# Patient Record
Sex: FEMALE | URBAN - METROPOLITAN AREA
[De-identification: names, ages, dates, MRNs, and addresses within clinical notes are randomized per-mention and may not be internally consistent; named-entity substitution may affect disease eponyms.]

---

## 2018-03-26 ENCOUNTER — TRANSFERRED RECORDS (OUTPATIENT)
Dept: HEALTH INFORMATION MANAGEMENT | Facility: CLINIC | Age: 23
End: 2018-03-26

## 2018-10-26 ENCOUNTER — TRANSFERRED RECORDS (OUTPATIENT)
Dept: HEALTH INFORMATION MANAGEMENT | Facility: CLINIC | Age: 23
End: 2018-10-26

## 2018-11-16 ENCOUNTER — OFFICE VISIT (OUTPATIENT)
Dept: PEDIATRICS | Facility: CLINIC | Age: 23
End: 2018-11-16
Payer: COMMERCIAL

## 2018-11-16 VITALS
OXYGEN SATURATION: 100 % | DIASTOLIC BLOOD PRESSURE: 82 MMHG | WEIGHT: 104.6 LBS | BODY MASS INDEX: 18.54 KG/M2 | SYSTOLIC BLOOD PRESSURE: 129 MMHG | TEMPERATURE: 98 F | HEART RATE: 102 BPM | HEIGHT: 63 IN

## 2018-11-16 DIAGNOSIS — R00.0 TACHYCARDIA: ICD-10-CM

## 2018-11-16 DIAGNOSIS — F98.8 ATTENTION DEFICIT DISORDER, UNSPECIFIED HYPERACTIVITY PRESENCE: ICD-10-CM

## 2018-11-16 DIAGNOSIS — F41.9 ANXIETY DISORDER, UNSPECIFIED TYPE: ICD-10-CM

## 2018-11-16 DIAGNOSIS — F32.A DEPRESSION, UNSPECIFIED DEPRESSION TYPE: ICD-10-CM

## 2018-11-16 DIAGNOSIS — R00.2 PALPITATIONS: Primary | ICD-10-CM

## 2018-11-16 DIAGNOSIS — D50.9 IRON DEFICIENCY ANEMIA, UNSPECIFIED IRON DEFICIENCY ANEMIA TYPE: ICD-10-CM

## 2018-11-16 DIAGNOSIS — H53.8 BLURRED VISION: ICD-10-CM

## 2018-11-16 LAB
ERYTHROCYTE [DISTWIDTH] IN BLOOD BY AUTOMATED COUNT: 11.2 % (ref 10–15)
FERRITIN SERPL-MCNC: 76 NG/ML (ref 12–150)
HCT VFR BLD AUTO: 42.4 % (ref 35–47)
HETEROPH AB SER QL: NEGATIVE
HGB BLD-MCNC: 14.3 G/DL (ref 11.7–15.7)
MCH RBC QN AUTO: 30.6 PG (ref 26.5–33)
MCHC RBC AUTO-ENTMCNC: 33.7 G/DL (ref 31.5–36.5)
MCV RBC AUTO: 91 FL (ref 78–100)
PLATELET # BLD AUTO: 318 10E9/L (ref 150–450)
RBC # BLD AUTO: 4.68 10E12/L (ref 3.8–5.2)
TSH SERPL DL<=0.005 MIU/L-ACNC: 1.38 MU/L (ref 0.4–4)
WBC # BLD AUTO: 8.4 10E9/L (ref 4–11)

## 2018-11-16 PROCEDURE — 86308 HETEROPHILE ANTIBODY SCREEN: CPT | Performed by: NURSE PRACTITIONER

## 2018-11-16 PROCEDURE — 85027 COMPLETE CBC AUTOMATED: CPT | Performed by: NURSE PRACTITIONER

## 2018-11-16 PROCEDURE — 99214 OFFICE O/P EST MOD 30 MIN: CPT | Performed by: NURSE PRACTITIONER

## 2018-11-16 PROCEDURE — 36415 COLL VENOUS BLD VENIPUNCTURE: CPT | Performed by: NURSE PRACTITIONER

## 2018-11-16 PROCEDURE — 84443 ASSAY THYROID STIM HORMONE: CPT | Performed by: NURSE PRACTITIONER

## 2018-11-16 PROCEDURE — 82728 ASSAY OF FERRITIN: CPT | Performed by: NURSE PRACTITIONER

## 2018-11-16 RX ORDER — DEXTROAMPHETAMINE SULFATE, DEXTROAMPHETAMINE SACCHARATE, AMPHETAMINE SULFATE AND AMPHETAMINE ASPARTATE 6.25; 6.25; 6.25; 6.25 MG/1; MG/1; MG/1; MG/1
CAPSULE, EXTENDED RELEASE ORAL
Refills: 0 | COMMUNITY
Start: 2018-10-17

## 2018-11-16 RX ORDER — ESCITALOPRAM OXALATE 20 MG/1
TABLET ORAL
Refills: 2 | COMMUNITY
Start: 2018-10-17

## 2018-11-16 RX ORDER — METHYLPHENIDATE HYDROCHLORIDE 10 MG/1
TABLET ORAL
Refills: 0 | COMMUNITY
Start: 2018-10-17

## 2018-11-16 RX ORDER — NORETHINDRONE AND ETHINYL ESTRADIOL 1 MG-35MCG
KIT ORAL
Refills: 3 | COMMUNITY
Start: 2018-10-20

## 2018-11-16 NOTE — NURSING NOTE
"Chief Complaint   Patient presents with     Establish Care     Blood Draw     requesting iron levels checked, hemoglobin, mono test     Heart Problem     patient notes possible heart murmur       Initial /82 (BP Location: Left arm, Patient Position: Sitting, Cuff Size: Adult Small)  Pulse 102  Temp 98  F (36.7  C) (Temporal)  Ht 5' 3.25\" (1.607 m)  Wt 104 lb 9.6 oz (47.4 kg)  SpO2 100%  Breastfeeding? No  BMI 18.38 kg/m2 Estimated body mass index is 18.38 kg/(m^2) as calculated from the following:    Height as of this encounter: 5' 3.25\" (1.607 m).    Weight as of this encounter: 104 lb 9.6 oz (47.4 kg).  Medication Reconciliation: complete      LORA Manriquez      "

## 2018-11-16 NOTE — MR AVS SNAPSHOT
After Visit Summary   11/16/2018    Ana Luis    MRN: [id number]           Patient Information     Date Of Birth          1995        Visit Information        Provider Department      11/16/2018 2:50 PM Reema Cartagena APRN CNP [facility]        Today's Diagnoses     Palpitations    -  1    Attention deficit disorder, unspecified hyperactivity presence        Anxiety disorder, unspecified type        Depression, unspecified depression type        Iron deficiency anemia, unspecified iron deficiency anemia type        Tachycardia        Blurred vision          Care Instructions    PLAN:   1.   Symptomatic therapy suggested: Continue current medications.    2.  Orders Placed This Encounter   Medications     ADDERALL XR 25 MG 24 hr capsule     Sig: TAKE 1 CAPSULE BY MOUTH ONCE EVERY MORNING     Refill:  0     escitalopram (LEXAPRO) 20 MG tablet     Sig: TAKE 1/2 TABLET BY MOUTH FOR 2 WEEKS THEN 1 TABLET DAILY THERAFTER     Refill:  2     methylphenidate (RITALIN) 10 MG tablet     Sig: TAKE 1 TABLET BY MOUTH ONCE DAILY BETWEEN 2-5PM FOR HOME WORK     Refill:  0     ALAYCEN 1/35 1-35 MG-MCG per tablet     Sig: TAKE 1 ACTIVE TABLET BY ORAL ROUTE ONCE DAILY     Refill:  3     Ibuprofen (ADVIL PO)     Sig: prn     Orders Placed This Encounter   Procedures     CBC with platelets     Ferritin     TSH with free T4 reflex     Comprehensive metabolic panel     Mononucleosis screen     MENTAL HEALTH REFERRAL  - Adult; Outpatient Treatment; Individual/Couples/Family/Group Therapy/Health Psychology; [facility] [place]: [facility] [phone]; We will contact you to schedule the appointment or please call with any questions     Echocardiogram Complete       3. Patient needs to follow up in if no improvement,or sooner if worsening of symptoms or other symptoms develop.  FURTHER TESTING:       - echocardiogram   Referral to counseling and please make appointment with eye  doctor  Will follow up and/or notify patient on results via phone or mail to determine further need for followup    It was a pleasure seeing you today at the [facility]. Thank you for allowing us to care for you today. We truly hope we provided you with the excellent service you deserve. Please let us know if there is anything else we can do for you so we can be sure you are leaving completley satisfied with your care experience.       General information about your clinic   Clinic Hours Lab Hours (Appointments are required)   Mon-Thurs: 7:30 AM - 7 PM Mon-Thurs: 7:30 AM - 7 PM   Fri: 7:30 AM - 5 PM Fri: 7:30 AM - 5 PM        After Hours Nurse Advise & Appts:  [place] Nurse Advisors: [phone]  [facility] On Call: to make appointments anytime: [phone] On Call Physician: call [phone] and answering service will page the on call physician.        For urgent appointments, please call [phone] and ask for the triage nurse or your care team clinic nurse.  How to contact my care team:  MyChart: [email or web address]   Phone: [phone]   Fax: [phone]       [place] [facility] [facility]:   Phone: [phone]  Hours: 8:00 AM - 6:00 PM  Medication Refills:  Call your pharmacy and they will forward the refill to us. Please allow 3 business days for your refills to be completed.       Normal or non-critical lab and imaging results will be communicated to you by MyChart, letter or phone within 7 days.  If you do not hear from us within 10 days, please call the clinic. If you have a critical or abnormal lab result, we will notify you by phone as soon as possible.       We now have PWIC (Pediatric Walk in Care)  Monday-Friday from 7:30-4. Simply walk in and be seen for your urgent needs like cough, fever, rash, diarrhea or vomiting, pink eye, UTI. No appointments needed. Ask one of the team for more information      -Your Care Team:    Dr. Phillip Flores - Internal Medicine/Pediatrics     Charlotte Timmons - Family Medicine  Dr. Jessica Ha - Pediatrics  Dr. Telma Donato - Pediatrics  Reema Cartagena CNP - Family Practice Nurse Practitioner                         Follow-ups after your visit        Additional Services     MENTAL HEALTH REFERRAL  - Adult; Outpatient Treatment; Individual/Couples/Family/Group Therapy/Health Psychology; [facility]: [facility] [phone]; We will contact you to schedule the appointment or please call with any questions       All scheduling is subject to the client's specific insurance plan & benefits, provider/location availability, and provider clinical specialities.  Please arrive 15 minutes early for your first appointment and bring your completed paperwork.    Please be aware that coverage of these services is subject to the terms and limitations of your health insurance plan.  Call member services at your health plan with any benefit or coverage questions.                            Future tests that were ordered for you today     Open Future Orders        Priority Expected Expires Ordered    Echocardiogram Complete Routine  11/16/2019 11/16/2018    Comprehensive metabolic panel Routine  2/15/2019 11/16/2018            Who to contact     If you have questions or need follow up information about today's clinic visit or your schedule please contact [facility] directly at [phone].  Normal or non-critical lab and imaging results will be communicated to you by MyChart, letter or phone within 4 business days after the clinic has received the results. If you do not hear from us within 7 days, please contact the clinic through MyChart or phone. If you have a critical or abnormal lab result, we will notify you by phone as soon as possible.  Submit refill requests through [employer] or call your pharmacy and they will forward the refill request to us. Please allow 3 business days for your refill to be completed.          Additional  "Information About Your Visit        [employer] Information     [employer] is an electronic gateway that provides easy, online access to your medical records. With [employer], you can request a clinic appointment, read your test results, renew a prescription or communicate with your care team.     To sign up for [employer] visit the website at [email or web address]   You will be asked to enter the access code listed below, as well as some personal information. Please follow the directions to create your username and password.     Your access code is: [email or web address]  Expires: 2019  3:30 PM     Your access code will  in 90 days. If you need help or a new code, please contact your [facility] or call [phone] for assistance.        Care EveryWhere ID     This is your Care EveryWhere ID. This could be used by other organizations to access your [place] medical records  [id number]        Your Vitals Were     Pulse Temperature Height Pulse Oximetry Breastfeeding? BMI (Body Mass Index)    102 98  F (36.7  C) (Temporal) 5' 3.25\" (1.607 m) 100% No 18.38 kg/m2       Blood Pressure from Last 3 Encounters:   18 129/82   09 100/62    Weight from Last 3 Encounters:   18 104 lb 9.6 oz (47.4 kg)   09 63 lb (28.6 kg) (3 %)*     * Growth percentiles are based on [facility] 2-20 Years data.              We Performed the Following     CBC with platelets     Ferritin     MENTAL HEALTH REFERRAL  - Adult; Outpatient Treatment; Individual/Couples/Family/Group Therapy/Health Psychology; [facility]: [facility] [phone]; We will contact you to schedule the appointment or please call with any questions     Mononucleosis screen     TSH with free T4 reflex        Primary Care Provider Fax #    Physician No Ref-Primary [phone]       No address on file        Equal Access to Services     LEAH CHARLES : Andressa Dean, liang bang, alicia scanlon " shala billsharshad goldenadelfo choaakizzy ah. [facility] [phone].    ATENCIÓN: Si habla luis armando, tiene a harris disposición servicios gratuitos de asistencia lingüística. Kendall al [phone].    We comply with applicable federal civil rights laws and [place] laws. We do not discriminate on the basis of race, color, national origin, age, disability, sex, sexual orientation, or gender identity.            Thank you!     Thank you for choosing [facility]  for your care. Our goal is always to provide you with excellent care. Hearing back from our patients is one way we can continue to improve our services. Please take a few minutes to complete the written survey that you may receive in the mail after your visit with us. Thank you!             Your Updated Medication List - Protect others around you: Learn how to safely use, store and throw away your medicines at www.disposemymeds.org.          This list is accurate as of 11/16/18  3:33 PM.  Always use your most recent med list.                   Brand Name Dispense Instructions for use Diagnosis    ADDERALL XR 25 MG 24 hr capsule   Generic drug:  amphetamine-dextroamphetamine      TAKE 1 CAPSULE BY MOUTH ONCE EVERY MORNING        ADVIL PO      prn        ALAYCEN 1/35 1-35 MG-MCG per tablet   Generic drug:  norethindrone-ethinyl estradiol      TAKE 1 ACTIVE TABLET BY ORAL ROUTE ONCE DAILY        escitalopram 20 MG tablet    LEXAPRO     TAKE 1/2 TABLET BY MOUTH FOR 2 WEEKS THEN 1 TABLET DAILY THERAFTER        methylphenidate 10 MG tablet    RITALIN     TAKE 1 TABLET BY MOUTH ONCE DAILY BETWEEN 2-5PM FOR HOME WORK

## 2018-11-16 NOTE — PATIENT INSTRUCTIONS
PLAN:   1.   Symptomatic therapy suggested: Continue current medications.    2.  Orders Placed This Encounter   Medications     ADDERALL XR 25 MG 24 hr capsule     Sig: TAKE 1 CAPSULE BY MOUTH ONCE EVERY MORNING     Refill:  0     escitalopram (LEXAPRO) 20 MG tablet     Sig: TAKE 1/2 TABLET BY MOUTH FOR 2 WEEKS THEN 1 TABLET DAILY THERAFTER     Refill:  2     methylphenidate (RITALIN) 10 MG tablet     Sig: TAKE 1 TABLET BY MOUTH ONCE DAILY BETWEEN 2-5PM FOR HOME WORK     Refill:  0     ALAYCEN 1/35 1-35 MG-MCG per tablet     Sig: TAKE 1 ACTIVE TABLET BY ORAL ROUTE ONCE DAILY     Refill:  3     Ibuprofen (ADVIL PO)     Sig: prn     Orders Placed This Encounter   Procedures     CBC with platelets     Ferritin     TSH with free T4 reflex     Comprehensive metabolic panel     Mononucleosis screen     MENTAL HEALTH REFERRAL  - Adult; Outpatient Treatment; Individual/Couples/Family/Group Therapy/Health Psychology; [facility] [place] [facility] [phone]; We will contact you to schedule the appointment or please call with any questions     Echocardiogram Complete       3. Patient needs to follow up in if no improvement,or sooner if worsening of symptoms or other symptoms develop.  FURTHER TESTING:       - echocardiogram   Referral to counseling and please make appointment with eye doctor  Will follow up and/or notify patient on results via phone or mail to determine further need for followup    It was a pleasure seeing you today at the [facility]. Thank you for allowing us to care for you today. We truly hope we provided you with the excellent service you deserve. Please let us know if there is anything else we can do for you so we can be sure you are leaving completley satisfied with your care experience.       General information about your clinic   Clinic Hours Lab Hours (Appointments are required)   Mon-Thurs: 7:30 AM - 7 PM Mon-Thurs: 7:30 AM - 7 PM   Fri: 7:30 AM - 5 PM Fri:  7:30 AM - 5 PM        After Hours Nurse Advise & Appts:  [place] Nurse Advisors: [phone]  [place] On Call: to make appointments anytime: [phone] On Call Physician: call [phone] and answering service will page the on call physician.        For urgent appointments, please call [phone] and ask for the triage nurse or your care team clinic nurse.  How to contact my care team:  [place]: [email or web address]   Phone: [phone]   Fax: [phone]       [facility]:   Phone: [phone]  Hours: 8:00 AM - 6:00 PM  Medication Refills:  Call your pharmacy and they will forward the refill to us. Please allow 3 business days for your refills to be completed.       Normal or non-critical lab and imaging results will be communicated to you by MyChart, letter or phone within 7 days.  If you do not hear from us within 10 days, please call the clinic. If you have a critical or abnormal lab result, we will notify you by phone as soon as possible.       We now have PWIC (Pediatric Walk in Care)  Monday-Friday from 7:30-4. Simply walk in and be seen for your urgent needs like cough, fever, rash, diarrhea or vomiting, pink eye, UTI. No appointments needed. Ask one of the team for more information      -Your Care Team:    Dr. Phillip Flores - Internal Medicine/Pediatrics   Dr. Charlotte Timmons - Family Medicine  Dr. Jessica Ha - Pediatrics  Dr. Telma Donato - Pediatrics  Reema Cartagena CNP - Family Practice Nurse Practitioner

## 2018-11-16 NOTE — PROGRESS NOTES
SUBJECTIVE:   Ana Luis is a 23 year old female who presents to clinic today for the following health issues:    New Patient/[facility] of Care-[facility] in pediatrics     1. requesting iron, hemoglobin levels checked and to have mono test completed. Patient notes roommate had mono.  Having fatigue for about a month  Roommate diagnosed 2 weeks ago with mono  She is concerned could have mono as well     CHEST PAIN     Onset: on and off for 2 months    Description:   Location:  Middle of chest  Character: tight  Radiation: varies   Duration: waxing and waning     Intensity: moderate, severe    Progression of Symptoms:  improving    Accompanying Signs & Symptoms:  Shortness of breath: YES- feels like trying to catch her breath  Sweating: no  Nausea/vomiting: no  Lightheadedness: YES  Palpitations: YES  Fever/Chills: no  Cough: no  Heartburn: YES    History:   Family history of heart disease no  Tobacco use: no    Precipitating factors:   Worse with exertion: no  Worse with deep breaths :  no  Related to food: no    Alleviating factors:  none       Therapies Tried and outcome: none  Has a history of anxiety and is seeing a child psychiatrist and has been seeing her since senior year in high school   Has been having these chest pain episodes intermittently for 2 months  Sometimes will feel like heartburn but really feels like more likely anxiety attack   Has been told sometimes that she has murmur but sometimes not heard  Does have heart palpitations in the last semester of school  Does drink caffeine and has tried to cut down to one glass of tea a day  Was given a script for Lexapro but has not started it yet   Next appointment is in about 3 months for med check for ADD and for anxiety and depression     Problem list and histories reviewed & adjusted, as indicated.  Additional history: as documented    Patient Active Problem List   Diagnosis     Ankle joint pain     ADD (attention deficit disorder)     Anxiety  disorder     Depression     Anemia     History reviewed. No pertinent surgical history.    Social History   Substance Use Topics     Smoking status: Never Smoker     Smokeless tobacco: Never Used     Alcohol use Yes      Comment: patient notes more on the weekends      Family History   Problem Relation Age of Onset     Other - See Comments Father      heart problems     Diabetes No family hx of      Coronary Artery Disease No family hx of      Hypertension No family hx of      Hyperlipidemia No family hx of      Cerebrovascular Disease No family hx of          Current Outpatient Prescriptions   Medication Sig Dispense Refill     ADDERALL XR 25 MG 24 hr capsule TAKE 1 CAPSULE BY MOUTH ONCE EVERY MORNING  0     ALAYCEN 1/35 1-35 MG-MCG per tablet TAKE 1 ACTIVE TABLET BY ORAL ROUTE ONCE DAILY  3     escitalopram (LEXAPRO) 20 MG tablet TAKE 1/2 TABLET BY MOUTH FOR 2 WEEKS THEN 1 TABLET DAILY THERAFTER  2     Ibuprofen (ADVIL PO) prn       methylphenidate (RITALIN) 10 MG tablet TAKE 1 TABLET BY MOUTH ONCE DAILY BETWEEN 2-5PM FOR HOME WORK  0     Allergies   Allergen Reactions     Penicillins Hives     Labs reviewed in EPIC    Reviewed and updated as needed this visit by clinical staff  Tobacco  Allergies  Meds  Med Hx  Surg Hx  Fam Hx  Soc Hx      Reviewed and updated as needed this visit by Provider         ROS:  CONSTITUTIONAL:NEGATIVE for fever, chills, change in weight and POSITIVE  for fatigue  Has had blurry vision for the last semester. Last eye exam not sure when   ENT/MOUTH: NEGATIVE for ear, mouth and throat problems  RESP:NEGATIVE for significant cough or SOB  CV: POSITIVE for chest pain/chest pressure and palpitations and NEGATIVE for dyspnea on exertion, lower extremity edema and syncope or near-syncope  GI: NEGATIVE for nausea and vomiting  NEURO: NEGATIVE for weakness, dizziness or paresthesias  ENDOCRINE: NEGATIVE for temperature intolerance, skin/hair changes  MUSCULOSKELETAL: NEGATIVE for  "significant arthralgias or myalgia  PSYCHIATRIC: POSITIVE forHx anxiety, Hx depression, Hx panic attacks and ADD and is being treated for that  and NEGATIVE forthoughts of hurting someone else and thoughts of self harm    OBJECTIVE:     /82 (BP Location: Left arm, Patient Position: Sitting, Cuff Size: Adult Small)  Pulse 102  Temp 98  F (36.7  C) (Temporal)  Ht 5' 3.25\" (1.607 m)  Wt 104 lb 9.6 oz (47.4 kg)  SpO2 100%  Breastfeeding? No  BMI 18.38 kg/m2  Body mass index is 18.38 kg/(m^2).  GENERAL APPEARANCE: alert, active and no distress  RESP: lungs clear to auscultation - no rales, rhonchi or wheezes  CV: regular rates and rhythm, no murmur, click or rub and no irregular beats  MS: extremities normal- no gross deformities noted and peripheral pulses normal  SKIN: no suspicious lesions or rashes  NEURO: Normal strength and tone, mentation intact and speech normal  PSYCH: mentation appears normal and affect normal/bright  MENTAL STATUS EXAM:  Appearance/Behavior: No apparent distress and Casually groomed  Speech: Normal  Mood/Affect: normal affect  Insight: Adequate    Diagnostic Test Results:  Results for orders placed or performed in visit on 11/16/18   CBC with platelets   Result Value Ref Range    WBC 8.4 4.0 - 11.0 10e9/L    RBC Count 4.68 3.8 - 5.2 10e12/L    Hemoglobin 14.3 11.7 - 15.7 g/dL    Hematocrit 42.4 35.0 - 47.0 %    MCV 91 78 - 100 fl    MCH 30.6 26.5 - 33.0 pg    MCHC 33.7 31.5 - 36.5 g/dL    RDW 11.2 10.0 - 15.0 %    Platelet Count 318 150 - 450 10e9/L   Ferritin   Result Value Ref Range    Ferritin 76 12 - 150 ng/mL   TSH with free T4 reflex   Result Value Ref Range    TSH 1.38 0.40 - 4.00 mU/L   Mononucleosis screen   Result Value Ref Range    Mononucleosis Screen Negative NEG^Negative       ASSESSMENT/PLAN:     Aan was seen today for establish care, blood draw and heart problem.    Diagnoses and all orders for this visit:    Palpitations  -     CBC with platelets  -     " Ferritin  -     TSH with free T4 reflex  -     Comprehensive metabolic panel; Future  -     Mononucleosis screen  -     Echocardiogram Complete; Future  Will follow up and/or notify patient of  results via My Chart to determine further need for followup    Tachycardia  -     Comprehensive metabolic panel; Future  -     Echocardiogram Complete; Future  Will follow up and/or notify patient of  results via My Chart to determine further need for followup      Blurred vision  -     Mononucleosis screen  Make appointment with eye MD     Iron deficiency anemia, unspecified iron deficiency anemia type  -     CBC with platelets  -     Ferritin    Attention deficit disorder, unspecified hyperactivity presence  -     TSH with free T4 reflex  -     MENTAL HEALTH REFERRAL  - Adult; Outpatient Treatment; Individual/Couples/Family/Group Therapy/Health Psychology; [facility] [place] [facility] [phone]; We will contact you to schedule the appointment or please call with any questions  FOLLOW UP WITH SPECIALIST :Psychiatry    Anxiety disorder, unspecified type  -     TSH with free T4 reflex  -     MENTAL HEALTH REFERRAL  - Adult; Outpatient Treatment; Individual/Couples/Family/Group Therapy/Health Psychology; [facility] [place] [phone]; We will contact you to schedule the appointment or please call with any questions  FOLLOW UP WITH SPECIALIST :Psychiatry      Depression, unspecified depression type  -     TSH with free T4 reflex  -     MENTAL HEALTH REFERRAL  - Adult; Outpatient Treatment; Individual/Couples/Family/Group Therapy/Health Psychology; [facility] [place] [phone]; We will contact you to schedule the appointment or please call with any questions  FOLLOW UP WITH SPECIALIST :Psychiatry      PLAN:    Patient needs to follow up in if no improvement,or sooner if worsening of symptoms or other symptoms develop.  FURTHER TESTING:       - echocardiogram   Referral to counseling  and please make appointment with eye doctor  Will follow up and/or notify patient on results via phone or mail to determine further need for followup      See Patient Instructions    ROBER Lane CNP [facility]

## 2018-11-19 ENCOUNTER — TELEPHONE (OUTPATIENT)
Dept: PEDIATRICS | Facility: CLINIC | Age: 23
End: 2018-11-19

## 2018-11-19 NOTE — TELEPHONE ENCOUNTER
Notes Recorded by Reema Cartagena, ROBER CNP on 11/18/2018 at 7:14 PM  Please call   -All of your labs are normal.  Reema Cartagena, NP, APRN CNP       Ref Range & Units 3d ago       TSH 0.40 - 4.00 mU/L 1.38     Resulting Agency  [facility]          Specimen Collected: 11/16/18  3:38 PM Last Resulted: 11/16/18  4:14 PM         Ref Range & Units 3d ago        Ferritin 12 - 150 ng/mL 76     Resulting Agency  [facility]          Specimen Collected: 11/16/18  3:38 PM Last Resulted: 11/16/18  4:13 PM            Ref Range & Units 3d ago       Mononucleosis Screen NEG^Negative Negative     Resulting Agency  [facility]          Specimen Collected: 11/16/18  3:38 PM Last Resulted: 11/16/18  3:59 PM         Ref Range & Units 3d ago        WBC 4.0 - 11.0 10e9/L 8.4     RBC Count 3.8 - 5.2 10e12/L 4.68     Hemoglobin 11.7 - 15.7 g/dL 14.3     Hematocrit 35.0 - 47.0 % 42.4     MCV 78 - 100 fl 91     MCH 26.5 - 33.0 pg 30.6     MCHC 31.5 - 36.5 g/dL 33.7     RDW 10.0 - 15.0 % 11.2     Platelet Count 150 - 450 10e9/L 318     Resulting Agency  [facility]          Specimen Collected: 11/16/18  3:38 PM Last Resulted: 11/16/18  3:50 PM

## 2018-11-20 ENCOUNTER — RADIANT APPOINTMENT (OUTPATIENT)
Dept: CARDIOLOGY | Facility: CLINIC | Age: 23
End: 2018-11-20
Attending: NURSE PRACTITIONER
Payer: COMMERCIAL

## 2018-11-20 ENCOUNTER — TELEPHONE (OUTPATIENT)
Dept: PEDIATRICS | Facility: CLINIC | Age: 23
End: 2018-11-20

## 2018-11-20 DIAGNOSIS — R00.0 TACHYCARDIA: ICD-10-CM

## 2018-11-20 PROCEDURE — 93306 TTE W/DOPPLER COMPLETE: CPT

## 2018-11-20 NOTE — PROGRESS NOTES
Demetrio Luis,    Attached are your test results.  Echocardiogram is normal    Please contact us if you have any questions.    Reema Cartagena, CNP

## 2018-11-20 NOTE — TELEPHONE ENCOUNTER
[facility] Call Center    Phone Message    May a detailed message be left on voicemail: yes    Reason for Call: Patient called and is requesting a call back as she has some questions relating to her test results. Thank you    Action Taken: Message routed to:  Primary Care p [phone]

## 2018-11-21 NOTE — TELEPHONE ENCOUNTER
Patient Contact    Attempt # 1    Was call answered?  No.  Left message on home number to return call to clinic.  Obi MAYFIELD CMA

## 2018-11-23 NOTE — TELEPHONE ENCOUNTER
Attempt #2    Called patient, left message with phone number to call back.      Ann-Marie Alcala RN, [facility]

## 2018-11-26 NOTE — TELEPHONE ENCOUNTER
Patient Contact    Attempt # 3    Was call answered?  No.  Left message on home/cell number to return call to clinic regarding request for questions about lab results. Advised in message clinic has attempted to reach her multiple times with no response.  Obi MAYFIELD CMA

## 2020-02-24 ENCOUNTER — HEALTH MAINTENANCE LETTER (OUTPATIENT)
Age: 25
End: 2020-02-24

## 2020-12-13 ENCOUNTER — HEALTH MAINTENANCE LETTER (OUTPATIENT)
Age: 25
End: 2020-12-13

## 2021-04-17 ENCOUNTER — HEALTH MAINTENANCE LETTER (OUTPATIENT)
Age: 26
End: 2021-04-17

## 2021-09-26 ENCOUNTER — HEALTH MAINTENANCE LETTER (OUTPATIENT)
Age: 26
End: 2021-09-26

## 2022-05-08 ENCOUNTER — HEALTH MAINTENANCE LETTER (OUTPATIENT)
Age: 27
End: 2022-05-08

## 2023-04-23 ENCOUNTER — HEALTH MAINTENANCE LETTER (OUTPATIENT)
Age: 28
End: 2023-04-23

## 2023-06-02 ENCOUNTER — HEALTH MAINTENANCE LETTER (OUTPATIENT)
Age: 28
End: 2023-06-02

## 2024-04-17 ENCOUNTER — APPOINTMENT (OUTPATIENT)
Dept: FAMILY MEDICINE | Facility: CLINIC | Age: 29
End: 2024-04-17
Payer: COMMERCIAL

## 2024-04-17 VITALS
HEIGHT: 64.17 IN | SYSTOLIC BLOOD PRESSURE: 120 MMHG | TEMPERATURE: 97.8 F | BODY MASS INDEX: 17.61 KG/M2 | OXYGEN SATURATION: 96 % | DIASTOLIC BLOOD PRESSURE: 80 MMHG | HEART RATE: 89 BPM | RESPIRATION RATE: 16 BRPM | WEIGHT: 103.13 LBS

## 2024-04-17 DIAGNOSIS — Z78.9 OTHER SPECIFIED HEALTH STATUS: ICD-10-CM

## 2024-04-17 DIAGNOSIS — Z86.59 PERSONAL HISTORY OF OTHER MENTAL AND BEHAVIORAL DISORDERS: ICD-10-CM

## 2024-04-17 DIAGNOSIS — Z00.00 ENCOUNTER FOR GENERAL ADULT MEDICAL EXAMINATION W/OUT ABNORMAL FINDINGS: ICD-10-CM

## 2024-04-17 DIAGNOSIS — Z01.84 ENCOUNTER FOR ANTIBODY RESPONSE EXAMINATION: ICD-10-CM

## 2024-04-17 DIAGNOSIS — Z11.1 ENCOUNTER FOR SCREENING FOR RESPIRATORY TUBERCULOSIS: ICD-10-CM

## 2024-04-17 DIAGNOSIS — Z82.49 FAMILY HISTORY OF ISCHEMIC HEART DISEASE AND OTHER DISEASES OF THE CIRCULATORY SYSTEM: ICD-10-CM

## 2024-04-17 DIAGNOSIS — G43.909 MIGRAINE, UNSPECIFIED, NOT INTRACTABLE, W/OUT STATUS MIGRAINOSUS: ICD-10-CM

## 2024-04-17 PROCEDURE — 99395 PREV VISIT EST AGE 18-39: CPT

## 2024-04-17 PROCEDURE — 36415 COLL VENOUS BLD VENIPUNCTURE: CPT

## 2024-04-17 RX ORDER — LISDEXAMFETAMINE DIMESYLATE 40 MG/1
40 TABLET, CHEWABLE ORAL
Refills: 0 | Status: ACTIVE | COMMUNITY

## 2024-04-17 RX ORDER — FLUOXETINE HYDROCHLORIDE 10 MG/1
10 CAPSULE ORAL DAILY
Refills: 0 | Status: ACTIVE | COMMUNITY

## 2024-04-17 NOTE — HEALTH RISK ASSESSMENT
[Good] : ~his/her~  mood as  good [2 - 4 times a month (2 pts)] : 2-4 times a month (2 points) [3 or 4 (1 pt)] : 3 or 4  (1 point) [Never (0 pts)] : Never (0 points) [Yes] : In the past 12 months have you used drugs other than those required for medical reasons? Yes [Little interest or pleasure doing things] : 1) Little interest or pleasure doing things [Feeling down, depressed, or hopeless] : 2) Feeling down, depressed, or hopeless [0] : 2) Feeling down, depressed, or hopeless: Not at all (0) [PHQ-2 Negative - No further assessment needed] : PHQ-2 Negative - No further assessment needed [PHQ-9 Negative - No further assessment needed] : PHQ-9 Negative - No further assessment needed [Patient reported PAP Smear was normal] : Patient reported PAP Smear was normal [HIV test declined] : HIV test declined [With Family] : lives with family [Student] : student [College] : College [Significant Other] : lives with significant other [# Of Children ___] : has [unfilled] children [Sexually Active] : sexually active [Feels Safe at Home] : Feels safe at home [Never] : Never [Hepatitis C test declined] : Hepatitis C test declined [0-4] : 0-4 [FreeTextEntry1] : None  [de-identified] : Social  [de-identified] : Work out  [de-identified] : okay  [HOQ3Tapil] : 0 [Change in mental status noted] : No change in mental status noted [High Risk Behavior] : no high risk behavior [Reports changes in hearing] : Reports no changes in hearing [Reports changes in vision] : Reports no changes in vision [Reports changes in dental health] : Reports no changes in dental health [PapSmearDate] : 2022 [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

## 2024-04-17 NOTE — ASSESSMENT
[FreeTextEntry1] : School forms to be completed in fullness once labs have resulted. Pt will  completed form from the office.

## 2024-04-17 NOTE — HISTORY OF PRESENT ILLNESS
[FreeTextEntry1] : New pt health assessment for school [de-identified] : 29 yo female pt with h/o ADHD, migraine, presenting today for health physical assessment for clinicals. Pt is new to the office. 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 Needs immunity testing for Hep B, MMRV, as well as TB screening with quantiferon. Denies any past positive TB screening.  Recently returned from [place], recovering from traveler's diarrhea sxs.  She has a Primary care Provider in [place] (pt's hometown) that she follows with annually.

## 2024-04-22 LAB
HBV SURFACE AB SER QL: REACTIVE
HBV SURFACE AG SER QL: NONREACTIVE
M TB IFN-G BLD-IMP: NEGATIVE
MEV IGG FLD QL IA: 33.8 AU/ML
MEV IGG+IGM SER-IMP: POSITIVE
MUV AB SER-ACNC: POSITIVE
MUV IGG SER QL IA: 26.8 AU/ML
QUANTIFERON TB PLUS MITOGEN MINUS NIL: 3.88 IU/ML
QUANTIFERON TB PLUS NIL: 0.03 IU/ML
QUANTIFERON TB PLUS TB1 MINUS NIL: 0 IU/ML
QUANTIFERON TB PLUS TB2 MINUS NIL: 0 IU/ML
RUBV IGG FLD-ACNC: 4 INDEX
RUBV IGG SER-IMP: POSITIVE
VZV AB TITR SER: NORMAL
VZV IGG SER IF-ACNC: 144.2 INDEX

## 2024-05-01 ENCOUNTER — APPOINTMENT (OUTPATIENT)
Dept: FAMILY MEDICINE | Facility: CLINIC | Age: 29
End: 2024-05-01

## 2024-05-01 ENCOUNTER — APPOINTMENT (OUTPATIENT)
Dept: INTERNAL MEDICINE | Facility: CLINIC | Age: 29
End: 2024-05-01
Payer: COMMERCIAL

## 2024-05-01 ENCOUNTER — MED ADMIN CHARGE (OUTPATIENT)
Age: 29
End: 2024-05-01

## 2024-05-01 DIAGNOSIS — Z23 ENCOUNTER FOR IMMUNIZATION: ICD-10-CM

## 2024-05-01 PROCEDURE — 90716 VAR VACCINE LIVE SUBQ: CPT

## 2024-05-01 PROCEDURE — 90471 IMMUNIZATION ADMIN: CPT
